# Patient Record
Sex: FEMALE | Race: WHITE | NOT HISPANIC OR LATINO | URBAN - METROPOLITAN AREA
[De-identification: names, ages, dates, MRNs, and addresses within clinical notes are randomized per-mention and may not be internally consistent; named-entity substitution may affect disease eponyms.]

---

## 2022-06-15 ENCOUNTER — INPATIENT (INPATIENT)
Facility: HOSPITAL | Age: 32
LOS: 4 days | Discharge: HOME | End: 2022-06-20
Attending: PSYCHIATRY & NEUROLOGY | Admitting: PSYCHIATRY & NEUROLOGY
Payer: COMMERCIAL

## 2022-06-15 VITALS
DIASTOLIC BLOOD PRESSURE: 86 MMHG | OXYGEN SATURATION: 98 % | TEMPERATURE: 98 F | SYSTOLIC BLOOD PRESSURE: 110 MMHG | RESPIRATION RATE: 18 BRPM | HEART RATE: 78 BPM

## 2022-06-15 DIAGNOSIS — F32.9 MAJOR DEPRESSIVE DISORDER, SINGLE EPISODE, UNSPECIFIED: ICD-10-CM

## 2022-06-15 LAB
ALBUMIN SERPL ELPH-MCNC: 4.7 G/DL — SIGNIFICANT CHANGE UP (ref 3.3–5)
ALP SERPL-CCNC: 97 U/L — SIGNIFICANT CHANGE UP (ref 40–120)
ALT FLD-CCNC: 13 U/L — SIGNIFICANT CHANGE UP (ref 4–33)
AMPHET UR-MCNC: NEGATIVE — SIGNIFICANT CHANGE UP
ANION GAP SERPL CALC-SCNC: 11 MMOL/L — SIGNIFICANT CHANGE UP (ref 7–14)
APAP SERPL-MCNC: <10 UG/ML — LOW (ref 15–25)
APPEARANCE UR: ABNORMAL
AST SERPL-CCNC: 15 U/L — SIGNIFICANT CHANGE UP (ref 4–32)
BACTERIA # UR AUTO: ABNORMAL
BARBITURATES UR SCN-MCNC: NEGATIVE — SIGNIFICANT CHANGE UP
BASOPHILS # BLD AUTO: 0.08 K/UL — SIGNIFICANT CHANGE UP (ref 0–0.2)
BASOPHILS NFR BLD AUTO: 1.2 % — SIGNIFICANT CHANGE UP (ref 0–2)
BENZODIAZ UR-MCNC: NEGATIVE — SIGNIFICANT CHANGE UP
BILIRUB SERPL-MCNC: 0.3 MG/DL — SIGNIFICANT CHANGE UP (ref 0.2–1.2)
BILIRUB UR-MCNC: NEGATIVE — SIGNIFICANT CHANGE UP
BUN SERPL-MCNC: 15 MG/DL — SIGNIFICANT CHANGE UP (ref 7–23)
CALCIUM SERPL-MCNC: 9.5 MG/DL — SIGNIFICANT CHANGE UP (ref 8.4–10.5)
CHLORIDE SERPL-SCNC: 104 MMOL/L — SIGNIFICANT CHANGE UP (ref 98–107)
CO2 SERPL-SCNC: 23 MMOL/L — SIGNIFICANT CHANGE UP (ref 22–31)
COCAINE METAB.OTHER UR-MCNC: NEGATIVE — SIGNIFICANT CHANGE UP
COLOR SPEC: YELLOW — SIGNIFICANT CHANGE UP
CREAT SERPL-MCNC: 0.82 MG/DL — SIGNIFICANT CHANGE UP (ref 0.5–1.3)
CREATININE URINE RESULT, DAU: 253 MG/DL — SIGNIFICANT CHANGE UP
DIFF PNL FLD: NEGATIVE — SIGNIFICANT CHANGE UP
EGFR: 97 ML/MIN/1.73M2 — SIGNIFICANT CHANGE UP
EOSINOPHIL # BLD AUTO: 0.24 K/UL — SIGNIFICANT CHANGE UP (ref 0–0.5)
EOSINOPHIL NFR BLD AUTO: 3.5 % — SIGNIFICANT CHANGE UP (ref 0–6)
EPI CELLS # UR: 9 /HPF — HIGH (ref 0–5)
ETHANOL SERPL-MCNC: <10 MG/DL — SIGNIFICANT CHANGE UP
GLUCOSE SERPL-MCNC: 83 MG/DL — SIGNIFICANT CHANGE UP (ref 70–99)
GLUCOSE UR QL: NEGATIVE — SIGNIFICANT CHANGE UP
HCG SERPL-ACNC: <5 MIU/ML — SIGNIFICANT CHANGE UP
HCT VFR BLD CALC: 36.6 % — SIGNIFICANT CHANGE UP (ref 34.5–45)
HGB BLD-MCNC: 11.4 G/DL — LOW (ref 11.5–15.5)
HYALINE CASTS # UR AUTO: 5 /LPF — SIGNIFICANT CHANGE UP (ref 0–7)
IANC: 2.9 K/UL — SIGNIFICANT CHANGE UP (ref 1.8–7.4)
IMM GRANULOCYTES NFR BLD AUTO: 0.3 % — SIGNIFICANT CHANGE UP (ref 0–1.5)
KETONES UR-MCNC: NEGATIVE — SIGNIFICANT CHANGE UP
LEUKOCYTE ESTERASE UR-ACNC: ABNORMAL
LYMPHOCYTES # BLD AUTO: 3.11 K/UL — SIGNIFICANT CHANGE UP (ref 1–3.3)
LYMPHOCYTES # BLD AUTO: 45.2 % — HIGH (ref 13–44)
MCHC RBC-ENTMCNC: 25.7 PG — LOW (ref 27–34)
MCHC RBC-ENTMCNC: 31.1 GM/DL — LOW (ref 32–36)
MCV RBC AUTO: 82.6 FL — SIGNIFICANT CHANGE UP (ref 80–100)
METHADONE UR-MCNC: NEGATIVE — SIGNIFICANT CHANGE UP
MONOCYTES # BLD AUTO: 0.53 K/UL — SIGNIFICANT CHANGE UP (ref 0–0.9)
MONOCYTES NFR BLD AUTO: 7.7 % — SIGNIFICANT CHANGE UP (ref 2–14)
NEUTROPHILS # BLD AUTO: 2.9 K/UL — SIGNIFICANT CHANGE UP (ref 1.8–7.4)
NEUTROPHILS NFR BLD AUTO: 42.1 % — LOW (ref 43–77)
NITRITE UR-MCNC: NEGATIVE — SIGNIFICANT CHANGE UP
NRBC # BLD: 0 /100 WBCS — SIGNIFICANT CHANGE UP
NRBC # FLD: 0 K/UL — SIGNIFICANT CHANGE UP
OPIATES UR-MCNC: NEGATIVE — SIGNIFICANT CHANGE UP
OXYCODONE UR-MCNC: NEGATIVE — SIGNIFICANT CHANGE UP
PCP SPEC-MCNC: SIGNIFICANT CHANGE UP
PCP UR-MCNC: NEGATIVE — SIGNIFICANT CHANGE UP
PH UR: 6.5 — SIGNIFICANT CHANGE UP (ref 5–8)
PLATELET # BLD AUTO: 325 K/UL — SIGNIFICANT CHANGE UP (ref 150–400)
POTASSIUM SERPL-MCNC: 4.2 MMOL/L — SIGNIFICANT CHANGE UP (ref 3.5–5.3)
POTASSIUM SERPL-SCNC: 4.2 MMOL/L — SIGNIFICANT CHANGE UP (ref 3.5–5.3)
PROT SERPL-MCNC: 7.7 G/DL — SIGNIFICANT CHANGE UP (ref 6–8.3)
PROT UR-MCNC: ABNORMAL
RBC # BLD: 4.43 M/UL — SIGNIFICANT CHANGE UP (ref 3.8–5.2)
RBC # FLD: 14.6 % — HIGH (ref 10.3–14.5)
RBC CASTS # UR COMP ASSIST: 4 /HPF — SIGNIFICANT CHANGE UP (ref 0–4)
SALICYLATES SERPL-MCNC: <0.3 MG/DL — LOW (ref 15–30)
SODIUM SERPL-SCNC: 138 MMOL/L — SIGNIFICANT CHANGE UP (ref 135–145)
SP GR SPEC: 1.03 — SIGNIFICANT CHANGE UP (ref 1–1.05)
THC UR QL: NEGATIVE — SIGNIFICANT CHANGE UP
UROBILINOGEN FLD QL: SIGNIFICANT CHANGE UP
WBC # BLD: 6.88 K/UL — SIGNIFICANT CHANGE UP (ref 3.8–10.5)
WBC # FLD AUTO: 6.88 K/UL — SIGNIFICANT CHANGE UP (ref 3.8–10.5)
WBC UR QL: 15 /HPF — HIGH (ref 0–5)

## 2022-06-15 PROCEDURE — 99285 EMERGENCY DEPT VISIT HI MDM: CPT

## 2022-06-15 RX ORDER — SERTRALINE 25 MG/1
150 TABLET, FILM COATED ORAL DAILY
Refills: 0 | Status: DISCONTINUED | OUTPATIENT
Start: 2022-06-16 | End: 2022-06-16

## 2022-06-15 NOTE — ED ADULT NURSE NOTE - OBJECTIVE STATEMENT
Pt BIB  for c/o increased depression and SI without plan.  Pt states she's not eating or sleeping and cannot function.  Pt is very tearful.  Pt denies SI/HI, AH/VH, ETOH, substance use.  PMhx of depression.

## 2022-06-15 NOTE — ED BEHAVIORAL HEALTH NOTE - BEHAVIORAL HEALTH NOTE
As per request of provider, writer contacted patient’s  [facility]les ([phone]) for collateral information. The following information is per patient’s .    Patient is a 30 YO Female domiciled with her  Robles and 6 month old baby. Patient BIB  for worsening depression and Si.  reports the patient shared that with the  today that she looked up how much pills she would need to take to end her life.  says the patient reports being too “chicken shit” to attempt to end her life. Patient reports to  that she is afraid that if something went wrong she would act on it. Patient has a hx of Si and bad spells of depression. No prior suicide attempts that the  is aware of.  reports he noticed the patient appearing depressed this past month. They tried to take her to her family this weekend in [place] but it didn’t help. Today the patient asked for help and wanted to come to the ED.  reports he has noticed the patient appear really depressed, lose interest in going to the park, has a blank stare with the baby and has been watching an unnormal amount of tv. Patient’s sleep is poor due to the baby, hygiene is not at baseline and appetite is poor.  reports patient has no prior psych hospitalizations and is connected to a private psychiatrist currently (Dr.Jordan Magdaleno).  unsure of the psychiatrist phone number. Patient has a hx of Zoloft 100 MG. Patient’s Zoloft was recently increased to 150Mg last month and the doctor also added Wellbutrin (unsure of dosage) a few weeks ago.  says the patient is compliant with medication. No drugs or alcohol reported.  says the patient has no medical problems and is covid vaccinated/boosted as of last year. Writer agreed to keep the  updated.

## 2022-06-15 NOTE — ED BEHAVIORAL HEALTH ASSESSMENT NOTE - MEDICAL ISSUES AND PLAN (INCLUDE STANDING AND PRN MEDICATION)
No known pertinent PMH- patient exclusively breastfeeding at this time, requesting to pump in hospital if possible

## 2022-06-15 NOTE — ED BEHAVIORAL HEALTH ASSESSMENT NOTE - SUMMARY
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 Psychiatry is consulted for further assessment.    On evaluation tonight, patient presents as calm and cooperative though markedly tearful, reporting depressed mood and  worsening suicidal ideation over the last several weeks. Patient noting suicidal thoughts have persisted despite attempts made by family to help distract her, upset and distressed that she has started searching online for lethal doses of medication to use in possible overdose. Able to cite some protective factors, however appears ambivalent about whether these thoughts might progress and whether she might "be capable" of acting with intent to harm herself or end her life.     Discussed that in light of depression and suicidal thoughts, patient currently deemed to pose acute risk to self. Explained process of voluntary hospitalization and patient is amenable. Reviewed paperwork with writer, signed 9.13 placed in chart. Patient would benefit from inpatient hospitalization for safety and stabilization.

## 2022-06-15 NOTE — ED BEHAVIORAL HEALTH ASSESSMENT NOTE - DETAILS
Recently googled lethal dose of wellbutrin, denies taking steps to enact this plan D/w  Blane D/w LUCY Dr. Robertson 6mos old

## 2022-06-15 NOTE — ED BEHAVIORAL HEALTH ASSESSMENT NOTE - DESCRIPTION
Patient monitored in  area, calm and cooperative. No PRN medication required or received.     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

## 2022-06-15 NOTE — ED BEHAVIORAL HEALTH ASSESSMENT NOTE - NSSUICPROTFACT_PSY_ALL_CORE
Responsibility to children, family, or others/Identifies reasons for living/Supportive social network of family or friends/Positive therapeutic relationships/Other

## 2022-06-15 NOTE — ED BEHAVIORAL HEALTH ASSESSMENT NOTE - PSYCHIATRIC ISSUES AND PLAN (INCLUDE STANDING AND PRN MEDICATION)
Continue home zoloft 150mg daily, hold wellbutrin 150mg daily, PRN- Ativan 2mg IM/PO q6hrs severe anxiety/agitation

## 2022-06-15 NOTE — ED PROVIDER NOTE - OBJECTIVE STATEMENT
31 year-old-female with past medical history of depress  presents to the emergency department with suicidal thoughts  does not have a plan  but did look up how much bupropion to take  she is prescribed bupropion by her psychiatrist, who instructed her to stop taking it  no homicidal ideation, no ah, no vh  no medical complaints  of note recently had a baby in december  story corroborated by  at [phone]

## 2022-06-15 NOTE — ED ADULT TRIAGE NOTE - CHIEF COMPLAINT QUOTE
p/t with hx postpartum depression, c/o of increased depression and SI no plan, p/t appears calm and cooperative @ present,

## 2022-06-15 NOTE — ED BEHAVIORAL HEALTH ASSESSMENT NOTE - HPI (INCLUDE ILLNESS QUALITY, SEVERITY, DURATION, TIMING, CONTEXT, MODIFYING FACTORS, ASSOCIATED SIGNS AND SYMPTOMS)
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 Psychiatry is consulted for further assessment.    On evaluation tonight, patient reports "I feel so overwhelmed," notes that she has had history of depression since she was a teenager and was managed on sertraline for many years. Since delivering her son in Dec 2021, patient states "I think I've been getting worse instead of better." Notes that she has experienced worsening depressed mood, poor energy, sleep disturbance, decreased appetite. Also reports "really bad intrusive thoughts," adding that these are suicidal in nature. States "I never thought things would get as bad as they have, but the other night I found myself googling how much wellbutrin I would need to take to kill myself." Patient states that this was upsetting and frightening to her and adds, "it makes me wonder about how terrible things could get because I was stunned at what I was researching." Patient denies having taken any steps to carry out possible OD on wellbutrin, does appear to have some ambivalence about whether she would "end up doing that just because I have been getting worse and worse." Is able to cite young son and partner as protective factors but becomes tearful stating "even though I have them I still feel so bad." Also notes poor sleep as she is primary caretaker for baby overnight. Family took her to father's house in [place] this weekend to "see if taking a break would help me," but states she felt the same despite the change in scenery. Denies HI, denies symptoms of indira including elevated mood and decreased need for sleep. Denies AH/VH. Denies intrusive thoughts about harming baby. Reports infant is being looked after by her mother in law at home. Denies use of substances including alcohol, tobacco and marijuana. Denies access to firearms.     Reports wellbutrin was added to her regimen several weeks ago for augmentation of zoloft, however notes "the NP and I decided it might be making me feel worse instead of better so we stopped it." Continues to take sertraline 150mg daily as prescribed.

## 2022-06-15 NOTE — ED BEHAVIORAL HEALTH ASSESSMENT NOTE - RISK ASSESSMENT
Moderate Acute Suicide Risk Patient is currently at low acute risk of suicide as she denies SI/HI in context of current hospitalization. Risk factors include history of mood disorder, multiple psychosocial stressors, recently gave birth, limited ability to engage in self-care (eg. poor sleep). Protective factors include strong social support, stable relationship, minor child, current sobriety, limited access to means, currently engaged in outpatient treatment.

## 2022-06-15 NOTE — ED BEHAVIORAL HEALTH NOTE - BEHAVIORAL HEALTH NOTE
COVID Exposure Screen- Patient  1.	*Have you had a COVID-19 test in the last 90 days?  (  ) Yes   ( X ) No   (  ) Unknown- Reason: _____  IF YES PROCEED TO QUESTION #2. IF NO OR UNKNOWN, PLEASE SKIP TO QUESTION #3.  2.	Date of test(s) and result(s): ________  3.	*Have you tested positive for COVID-19 antibodies? (  ) Yes   (  ) No   ( X ) Unknown- Reason: _____  IF YES PROCEED TO QUESTION #4. IF NO or UNKNOWN, PLEASE SKIP TO QUESTION #5.  4.	Date of positive antibody test: ________  5.	*Have you received 2 doses of the COVID-19 vaccine? ( X ) Yes   (  ) No   (  ) Unknown- Reason: _____   IF YES PROCEED TO QUESTION #6. IF NO or UNKNOWN, PLEASE SKIP TO QUESTION #7.  6.	Date of second dose: ________  7.	*In the past 10 days, have you been around anyone with a positive COVID-19 test?* (  ) Yes   ( X ) No   (  ) Unknown- Reason: ____  IF YES PROCEED TO QUESTION #8. IF NO or UNKNOWN, PLEASE SKIP TO QUESTION #13.  8.	Were you within 6 feet of them for at least 15 minutes? (  ) Yes   (  ) No   (  ) Unknown- Reason: _____  9.	Have you provided care for them? (  ) Yes   (  ) No   (  ) Unknown- Reason: ______  10.	Have you had direct physical contact with them (touched, hugged, or kissed them)? (  ) Yes   (  ) No    (  ) Unknown- Reason: _____  11.	Have you shared eating or drinking utensils with them? (  ) Yes   (  ) No    (  ) Unknown- Reason: ____  12.	Have they sneezed, coughed, or somehow gotten respiratory droplets on you? (  ) Yes   (  ) No    (  ) Unknown- Reason: ______  13.	*Have you been out of [place] within the past 10 days?* ( X ) Yes   (  ) No   (  ) Unknown- Reason: _____  IF YES PLEASE ANSWER THE FOLLOWING QUESTIONS:  14.	Which state/country have you been to? [place]  15.	Were you there over 24 hours? ( X ) Yes   (  ) No    (  ) Unknown- Reason: ______  16.	Date of return to [facility]: 6/12/22

## 2022-06-16 LAB
COVID-19 SPIKE DOMAIN AB INTERP: POSITIVE
COVID-19 SPIKE DOMAIN ANTIBODY RESULT: >250 U/ML — HIGH
FLUAV AG NPH QL: SIGNIFICANT CHANGE UP
FLUBV AG NPH QL: SIGNIFICANT CHANGE UP
RSV RNA NPH QL NAA+NON-PROBE: SIGNIFICANT CHANGE UP
SARS-COV-2 IGG+IGM SERPL QL IA: >250 U/ML — HIGH
SARS-COV-2 IGG+IGM SERPL QL IA: POSITIVE
SARS-COV-2 RNA SPEC QL NAA+PROBE: SIGNIFICANT CHANGE UP

## 2022-06-16 PROCEDURE — 99223 1ST HOSP IP/OBS HIGH 75: CPT

## 2022-06-16 RX ORDER — SERTRALINE 25 MG/1
175 TABLET, FILM COATED ORAL DAILY
Refills: 0 | Status: DISCONTINUED | OUTPATIENT
Start: 2022-06-16 | End: 2022-06-17

## 2022-06-16 RX ADMIN — SERTRALINE 150 MILLIGRAM(S): 25 TABLET, FILM COATED ORAL at 13:22

## 2022-06-16 NOTE — BH INPATIENT PSYCHIATRY ASSESSMENT NOTE - NSBHCHARTREVIEWVS_PSY_A_CORE FT
Vital Signs Last 24 Hrs  T(C): 36.7 (06-16-22 @ 01:21), Max: 37 (06-15-22 @ 20:51)  T(F): 98 (06-16-22 @ 01:21), Max: 98.6 (06-15-22 @ 20:51)  HR: 63 (06-15-22 @ 23:54) (63 - 79)  BP: 113/77 (06-15-22 @ 23:54) (110/86 - 113/79)  BP(mean): --  RR: 18 (06-15-22 @ 23:54) (18 - 18)  SpO2: 100% (06-15-22 @ 23:54) (98% - 100%)    Orthostatic VS  06-16-22 @ 01:21  Lying BP: --/-- HR: --  Sitting BP: 135/87 HR: 75  Standing BP: 126/86 HR: 89  Site: upper left arm  Mode: electronic   Vital Signs Last 24 Hrs  T(C): 36.7 (06-16-22 @ 01:21), Max: 36.7 (06-16-22 @ 01:21)  T(F): 98 (06-16-22 @ 01:21), Max: 98 (06-16-22 @ 01:21)  HR: 63 (06-15-22 @ 23:54) (63 - 63)  BP: 113/77 (06-15-22 @ 23:54) (113/77 - 113/77)  BP(mean): --  RR: 18 (06-15-22 @ 23:54) (18 - 18)  SpO2: 100% (06-15-22 @ 23:54) (100% - 100%)    Orthostatic VS  06-16-22 @ 01:21  Lying BP: --/-- HR: --  Sitting BP: 135/87 HR: 75  Standing BP: 126/86 HR: 89  Site: upper left arm  Mode: electronic

## 2022-06-16 NOTE — BH INPATIENT PSYCHIATRY ASSESSMENT NOTE - NSBHCHARTREVIEWLAB_PSY_A_CORE FT
CBC               11.4   6.88  )-----------( 325      ( 15 Lauri 2022 22:00 )             36.6     CMP  06-15    138  |  104  |  15  ----------------------------<  83  4.2   |  23  |  0.82    Ca    9.5      15 Lauri 2022 22:00    TPro  7.7  /  Alb  4.7  /  TBili  0.3  /  DBili  x   /  AST  15  /  ALT  13  /  AlkPhos  97  06-15

## 2022-06-16 NOTE — PSYCHIATRIC REHAB INITIAL EVALUATION - NSBHPRRECOMMEND_PSY_ALL_CORE
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 Patient endorsed poor sleep, low energy, decreased appetite. Patient denied AH/VH. Patient identified her son as her protective factor.   Writer attempted to meet with patient in order to orient the patient to the unit, and provide her with a copy of the unit schedule, however the patient was engaged in meeting with her son. Patient and writer were unable to establish a collaborative rehabilitation goal, therefore an appropriate goal will be chosen by writer. Psychiatric rehabilitation staff will continue to provide ongoing support and encouragement.

## 2022-06-16 NOTE — BH PATIENT PROFILE - FALL HARM RISK - UNIVERSAL INTERVENTIONS
Bed in lowest position, wheels locked, appropriate side rails in place/Call bell, personal items and telephone in reach/Instruct patient to call for assistance before getting out of bed or chair/Non-slip footwear when patient is out of bed/Gordon to call system/Physically safe environment - no spills, clutter or unnecessary equipment/Purposeful Proactive Rounding/Room/bathroom lighting operational, light cord in reach

## 2022-06-16 NOTE — BH INPATIENT PSYCHIATRY ASSESSMENT NOTE - CASE SUMMARY
Pt is a 30yo woman living with  and 6 month old baby, w/ PPhx depression, no prior hospitalizations or suicide attempts, in treatment with private psych NP for the past 4+ years, presenting with worsening depression including low mood, low energy, poor sleep, and passive SI (though recently researched overdose on Wellbutrin). Also feeling detached from her role as a mother and feeling significant guilt. Denies having thoughts of harming her child. States such symptoms worsening over the past 3 months, recently titrated Zoloft to 150mg and briefly trialed Wellbutrin (which made her anxiety worse). Pt remains hopeful about feeling better. Denies SIIP at this time and adamantely denied previous suicidal intent. Dx most consistent with postpartum depression. Case discussed with outpt psych NP as well as patient's . Will titrate Zoloft to 175mg daily. Will encourage pt to engage in individual and group therapy.

## 2022-06-16 NOTE — BH INPATIENT PSYCHIATRY ASSESSMENT NOTE - VIOLENCE PROTECTIVE FACTORS:
Residential stability/Relationship stability/Employment stability/Sobriety/Affective Stability/Good treatment response/compliance

## 2022-06-16 NOTE — BH INPATIENT PSYCHIATRY ASSESSMENT NOTE - HPI (INCLUDE ILLNESS QUALITY, SEVERITY, DURATION, TIMING, CONTEXT, MODIFYING FACTORS, ASSOCIATED SIGNS AND SYMPTOMS)
The patient is a 31 year-old female, , domiciled at home with  and infant (born December 2021), PPHx of MDD since she was a teenager (has been stable on Zoloft), no hx of inpatient Psychiatric hospitalization, no hx of SA, follows outpatient with Psychiatrist, no substance use history, admitted to [facility] 3 after presenting to [facility] ED BIB self after worsening symptoms of MDD, with SI.     Upon initial evaluation, the patient was alert, cooperative, anxious and tearful at times. She was seen in her room by the Primary Team. She states that she has been stable on Zoloft 75mg for years, but had to increase her medication dosage to 100mg in the past year during her pregnancy due to increased symptoms of depression and anxiety. The patient states that her MDD symptoms include: anhedonia (with isolation), increased sleep, depressed mood, decreased energy, and intermittent passive suicidal ideations of "what if I was not here anymore?" She states that after her pregnancy, her medication was increased to 150mg as she started having more intense symptoms of MDD as above. Although she has had weeks during which time she would enjoy time with her infant and have less symptoms of MDD, as of recently, she has had more recurrence of those symptoms. Her Psychiatrist also briefly trialed Wellbutrin, but discontinued it quickly as it was causing symptoms of anxiety. Regarding her most recent SI, she states that she was looking for the side effects of Wellbutrin and "Vocollect suggested links to 'how much Wellbutrin would it take to kill someone?'" The patient denied that she had active intent at the time and did not make any solidified plans. She denies SIIP currently and endorses feeling safe on the unit. She also feels comfortable to approach RN staff for any acute concerns. When discussing symptoms of indira and psychosis, she denies ever experiencing symptoms of indira or psychosis. She also denies a significant substance use history. She states that she is amenable to staying on the unit in the short term so that she can work on any medication titrations, changes, and/or outpatient program referrals as per team recommendations.

## 2022-06-16 NOTE — BH INPATIENT PSYCHIATRY ASSESSMENT NOTE - MSE UNSTRUCTURED FT
Appearance: Patient appears stated age and biological sex. Appropriately dressed and groomed in hospital gown.  Behavior: Patient was cooperative, in good behavioral control.  Speech: Normal in rate, tone, production, volume.  Motor: No gross motor abnormalities noted including clinically significant PMR/PMA, tremors, tics. Gait/station WNL.  Mood: Patient endorsed mood to be "depressed and anxious."  Affect: Depressed, anxious, tearful at times, stable, normal expressivity, congruent to mood.  Thought Process: Linear, logical  Thought Content: Appropriate given context, no preoccupations or obsessions. Currently denies SI/HI.  Perceptions: Denies A/V hallucinations.  Attention: Fair in the context of the Psychiatric interview.  Cognition: Suspect baseline to be grossly intact.   Memory: Suspect baseline to be grossly intact. Long-term memory overall intact in the context of current events.   Insight: Fair  Judgement: Fair  Impulsivity: Fair in the context of the inpatient unit.

## 2022-06-16 NOTE — BH INPATIENT PSYCHIATRY ASSESSMENT NOTE - DETAILS
As per HPI: patient recently googled lethal dose of Wellbutrin, denies taking steps to enact this plan.

## 2022-06-16 NOTE — BH PATIENT PROFILE - NSBHAPPR_PSY_A_CORE
Spoke to mother. She stated that patient has the flu with cough and fever, would like to r/s appt. Patient has been r/s to 03/02 @ 330pm.//[facility]   neat/clean

## 2022-06-16 NOTE — BH INPATIENT PSYCHIATRY ASSESSMENT NOTE - RISK ASSESSMENT
Modifiable Risk Factors: current symptoms of MDD, current anxiety, recent passive SI, current peripartum timeline  Unmodifiable Risk Factors: PPHx of MDD, hx of passive SI  Protective Factors: stable housing, supportive family, identifies reasons for living, sobriety, treatment adherence, therapeutic alliance  Given the factors above, the patient is at a moderate risk of harm to herself and a low risk of harm to others, and meets criteria for a voluntary admission for inpatient Psychiatric hospitalization for safety, stabilization, and treatment.

## 2022-06-16 NOTE — BH INPATIENT PSYCHIATRY ASSESSMENT NOTE - OTHER PAST PSYCHIATRIC HISTORY (INCLUDE DETAILS REGARDING ONSET, COURSE OF ILLNESS, INPATIENT/OUTPATIENT TREATMENT)
As per HPI, the patient has a long history of outpatient treatment and symptoms managed with sertraline. No previous inpatient Psychiatric hospitalizations.

## 2022-06-16 NOTE — BH SOCIAL WORK INITIAL PSYCHOSOCIAL EVALUATION - OTHER PAST PSYCHIATRIC HISTORY (INCLUDE DETAILS REGARDING ONSET, COURSE OF ILLNESS, INPATIENT/OUTPATIENT TREATMENT)
Patient is a 31F,, , delivered son in Dec 2021 (exclusively breastfeeding), owns a bagley shop, domiciled in private residence, PPHx depression managed by outpatient NP Geovany Magdaleno on sertraline and  newly added bupropion, no prior inpatient admissions, no history of SAs, no known legal/violence/substance history, no known PMH, brought in by  tonight at recommendation of NP for worsening depression and suicidal thoughts. Psychiatry is consulted for further assessment.

## 2022-06-16 NOTE — BH INPATIENT PSYCHIATRY ASSESSMENT NOTE - CURRENT MEDICATION
MEDICATIONS  (STANDING):  sertraline 150 milliGRAM(s) Oral daily    MEDICATIONS  (PRN):  LORazepam     Tablet 2 milliGRAM(s) Oral every 6 hours PRN severe anxiety/agitation  LORazepam   Injectable 2 milliGRAM(s) IntraMuscular once PRN severe anxiety/agitation   MEDICATIONS  (STANDING):  sertraline 175 milliGRAM(s) Oral daily    MEDICATIONS  (PRN):  LORazepam     Tablet 2 milliGRAM(s) Oral every 6 hours PRN severe anxiety/agitation  LORazepam   Injectable 2 milliGRAM(s) IntraMuscular once PRN severe anxiety/agitation

## 2022-06-16 NOTE — BH PSYCHOLOGY - CLINICIAN PSYCHOTHERAPY NOTE - NSBHPSYCHOLNARRATIVE_PSY_A_CORE FT
Supervising Psychologist met with the Patient for an individual supportive therapy session. Patient appeared both happy and sad; her visit with her baby had concluded earlier. She described her experience at the hospital as 'surreal' and spoke about wanting to be here yet wanting to be home as well. Patient and writer explored the pros and cons of staying in the hospital over the weekend. Writer outlined the importance of establishing a routing while on the unit. Patient spoke of reading a book she was familiar with as well as pumping and sleeping. Although tearful at times, patient recognized the importance of taking care of herself now so that she can take care of the baby later. Patient agreed to continue meeting for individual supportive therapy sessions.

## 2022-06-16 NOTE — BH INPATIENT PSYCHIATRY ASSESSMENT NOTE - NSBHASSESSSUMMFT_PSY_ALL_CORE
The patient is a 31 year-old female, , domiciled at home with  and infant (born 2023), PPHx of MDD since she was a teenager (has been stable on Zoloft), no hx of inpatient Psychiatric hospitalization, no hx of SA, follows outpatient with Psychiatrist, no substance use history, admitted to [facility] 3 after presenting to [facility] ED BIB self after worsening symptoms of MDD, with SI.     Upon initial evaluation and per chart documentation, the patient is likely experiencing recurrent symptoms of MDD 2/2 peripartum stressors. She likely meets DSM-V criteria for MDD with peripartum onset and is currently refractory to her current outpatient medication regimen. She is not currently actively suicidal and her recent SI was more ruminative rather than with active intent and plan. Overall, the patient is at a moderate risk of harm to herself and a low risk of harm to others, and meets criteria for a voluntary admission for inpatient Psychiatric hospitalization for safety, stabilization, and treatment. Will work to adjust current antidepressant regimen and also explore options for outpatient  Psychiatry programs.     Plan:  1. Routine Observation  2. Will continue with Zoloft 150mg qD as per outpatient regimen with plans of titrating to 200mg qD.   3. Encourage participation in group, individual, milieu therapy.  4. Dispo: Exploring options for outpatient  Psychiatry programs as per patient preference.  The patient is a 31 year-old female, , domiciled at home with  and infant (born 2023), PPHx of MDD since she was a teenager (has been stable on Zoloft), no hx of inpatient Psychiatric hospitalization, no hx of SA, follows outpatient with Psychiatrist, no substance use history, admitted to [facility] 3 after presenting to [facility] ED BIB self after worsening symptoms of MDD, with SI.     Upon initial evaluation and per chart documentation, the patient is likely experiencing recurrent symptoms of MDD 2/2 peripartum stressors. She likely meets DSM-V criteria for MDD with peripartum onset and is currently refractory to her current outpatient medication regimen. She is not currently actively suicidal and her recent SI was more ruminative rather than with active intent and plan. Overall, the patient is at a moderate risk of harm to herself and a low risk of harm to others, and meets criteria for a voluntary admission for inpatient Psychiatric hospitalization for safety, stabilization, and treatment. Will work to adjust current antidepressant regimen and also explore options for outpatient  Psychiatry programs.     Plan:  1. Routine Observation  2. Will titrate Zoloft to 175mg qD   3. Encourage participation in group, individual, milieu therapy.  4. Dispo: Exploring options for outpatient  Psychiatry programs as per patient preference.

## 2022-06-17 PROCEDURE — 99232 SBSQ HOSP IP/OBS MODERATE 35: CPT

## 2022-06-17 RX ORDER — SERTRALINE 25 MG/1
175 TABLET, FILM COATED ORAL DAILY
Refills: 0 | Status: COMPLETED | OUTPATIENT
Start: 2022-06-18 | End: 2022-06-18

## 2022-06-17 RX ORDER — SERTRALINE 25 MG/1
2 TABLET, FILM COATED ORAL
Qty: 60 | Refills: 0
Start: 2022-06-17 | End: 2022-07-16

## 2022-06-17 RX ORDER — SERTRALINE 25 MG/1
150 TABLET, FILM COATED ORAL
Qty: 0 | Refills: 0 | DISCHARGE

## 2022-06-17 RX ORDER — SERTRALINE 25 MG/1
200 TABLET, FILM COATED ORAL DAILY
Refills: 0 | Status: DISCONTINUED | OUTPATIENT
Start: 2022-06-19 | End: 2022-06-20

## 2022-06-17 RX ADMIN — SERTRALINE 175 MILLIGRAM(S): 25 TABLET, FILM COATED ORAL at 09:24

## 2022-06-17 NOTE — BH INPATIENT PSYCHIATRY PROGRESS NOTE - CURRENT MEDICATION
MEDICATIONS  (STANDING):  sertraline 175 milliGRAM(s) Oral daily    MEDICATIONS  (PRN):  LORazepam     Tablet 2 milliGRAM(s) Oral every 6 hours PRN severe anxiety/agitation  LORazepam   Injectable 2 milliGRAM(s) IntraMuscular once PRN severe anxiety/agitation   MEDICATIONS  (STANDING):    MEDICATIONS  (PRN):  LORazepam     Tablet 2 milliGRAM(s) Oral every 6 hours PRN severe anxiety/agitation  LORazepam   Injectable 2 milliGRAM(s) IntraMuscular once PRN severe anxiety/agitation

## 2022-06-17 NOTE — BH INPATIENT PSYCHIATRY PROGRESS NOTE - NSBHASSESSSUMMFT_PSY_ALL_CORE
The patient is a 31 year-old female, , domiciled at home with  and infant (born 2023), PPHx of MDD since she was a teenager (has been stable on Zoloft), no hx of inpatient Psychiatric hospitalization, no hx of SA, follows outpatient with Psychiatrist, no substance use history, admitted to [facility] 3 after presenting to [facility] ED BIB self after worsening symptoms of MDD, with SI.     The patient was euthymic, less anxious today with improvement in sleep as well. She is not suicidal and has been social with her roommate. She is amenable to increases to Zoloft and plans to establish care with an outpatient psychotherapist upon discharge.    Plan:  1. Routine Observation  2. C/w Zoloft 175mg qD, plan to titrate to 200mg qD on 22.    3. Encourage participation in group, individual, milieu therapy.  4. Dispo: Exploring options for outpatient  Psychiatry programs as per patient preference.

## 2022-06-17 NOTE — BH DISCHARGE NOTE NURSING/SOCIAL WORK/PSYCH REHAB - NSDCADDINFO1FT_PSY_ALL_CORE
SW made referrals to [email or web address] and [facility] as well.  SW will follow up with Pt regarding these referrals.

## 2022-06-17 NOTE — BH DISCHARGE NOTE NURSING/SOCIAL WORK/PSYCH REHAB - NSDCPRRECOMMEND_PSY_ALL_CORE
Psychiatric Rehabilitation staff recommends that the patient engage in outpatient services for continued medication and symptom management. Upon discharge, patient has an appointment scheduled with Geovany Magdaleno NP.

## 2022-06-17 NOTE — BH DISCHARGE NOTE NURSING/SOCIAL WORK/PSYCH REHAB - NSBHDCAGENCY2FT_PSY_A_CORE
Request for follow up appt was made to [facility]  Request for follow up appt was made to [facility]   Maria Antonia Benoit from intake will contact SW, Pt, and/or Pt's  after return to office on 6/21/22

## 2022-06-17 NOTE — BH DISCHARGE NOTE NURSING/SOCIAL WORK/PSYCH REHAB - DISCHARGE INSTRUCTIONS AFTERCARE APPOINTMENTS
In order to check the location, date, or time of your aftercare appointment, please refer to your Discharge Instructions Document given to you upon leaving the hospital.  If you have lost the instructions please call [phone]

## 2022-06-17 NOTE — BH DISCHARGE NOTE NURSING/SOCIAL WORK/PSYCH REHAB - NSDCPRGOAL_PSY_ALL_CORE
Writer met with patient to safety plan for discharge and discuss the patient’s progress throughout the inpatient stay. Patient was receptive to safety planning and readily identified coping skills, triggers, social support, and reasons for living. Upon admission, patient presented with depression and feelings of being overwhelmed due to psychosocial stressors in her life. Pt recently gave birth about 6 months ago and reported navigating new motherhood. Upon arrival to the unit, the pt was observed as social and engaged with low mood. Pt's stay on the unit was brief, however the pt started to exhibit improvements pretty quickly and engaged in the therapeutic milieu meaningfully. During groups, the pt offered good insight and autonomous thought and was also supportive and encouraging of her peers personal offerings. The pt was well spoken and thought about what she was going to contribute with meaning and intention. At discharge, the patient presents as eager to return to her 6 month old son and is looking forward to being back with family and starting a new balanced routine. Pt exhibited bright facial affect upon discharge and reported feeling "good" but also "nervous" about leaving the hospital. The patient was able to meet psych rehab goal during stay. The patient exhibits medication compliance, great ADLs, and good behavioral control. The patient denies SI/HI/AH/VH upon discharge from unit today. Pt encouraged to attend outpatient treatment services for medication management, support, and psychotherapy services.

## 2022-06-17 NOTE — BH DISCHARGE NOTE NURSING/SOCIAL WORK/PSYCH REHAB - PATIENT PORTAL LINK FT
You can access the FollowMyHealth Patient Portal offered by [facility] by registering at the following website: [email or web address]. By joining [facility]’s FollowMyHealth portal, you will also be able to view your health information using other applications (apps) compatible with our system.

## 2022-06-17 NOTE — BH DISCHARGE NOTE NURSING/SOCIAL WORK/PSYCH REHAB - NSCDUDCCRISIS_PSY_A_CORE
[facility] Well  1 (829) [facility]-WELL ([phone])  Text "WELL" to 53443  Website: www.[employer]/.[facility] 1 (368) 011-WIBZ (5532) Website: www.safehorizon.org/.National Suicide Prevention Lifeline 7 (970) 473-6373/.  [facility]  1 (345) LIFENET (547-0565)/.  [facility]’s Behavioral Health Crisis Center  [address] [place] [address] [phone]   Hours:  Monday through Friday from 9 AM to 3 PM/.  [facility] - [facility] Crisis Line  1 [phone] 708-6357, Option 1 Critical access hospital Well  1 (227) Critical access hospital-WELL ([phone])  Text "WELL" to 95676  Website: www.[employer]/.[facility]izons 1 (834) 751-TMDS (7818) Website: www.safehorizon.org/.National Suicide Prevention Lifeline 0 (205) 597-8819/.  [facility]  1 (300) LIFENET (195-8607)/.  [facility]’s Behavioral Health Crisis Center  [address] [place] [address] [phone]   Hours:  Monday through Friday from 9 AM to 3 PM Critical access hospital Well  1 (538) Critical access hospital-WELL ([phone])  Text "WELL" to 24722  Website: www.[employer]/.[facility]izons 1 (124) 401-UNTZ (0787) Website: www.safehorizon.org/.National Suicide Prevention Lifeline 6 (436) 313-3483/.  [facility]  1 (241) LIFENET (329-1438)/.  [facility]’s Behavioral Health Crisis Center  [address] [place] [address] [phone]   Hours:  Monday through Friday from 9 AM to 3 PM/.  [facility] Dept of  Affairs - Veterans Crisis Line  6 [phone], Option 1

## 2022-06-17 NOTE — BH INPATIENT PSYCHIATRY PROGRESS NOTE - NSBHFUPINTERVALHXFT_PSY_A_CORE
Chart reviewed, case discussed with the team. No acute behavioral events overnight. The patient is noted to be social with her roommate and adherent with all of her medications.     The patient was seen in her room. She was alert and cooperative. The patient noted some improvement in anxiety as compared to yesterday, but mentions that she is still experiencing a depressed mood overall. She denies SI/HI and A/V hallucinations. The patient was able to visit with  and infant son with staff supervision, which the patient endorsed appreciation for. She also endorsed good sleep and appetite. She is amenable to increasing her Zoloft and potential discharge back to her outpatient provider early next week.

## 2022-06-17 NOTE — BH INPATIENT PSYCHIATRY PROGRESS NOTE - CASE SUMMARY
Depression and anxiety improved slightly, pt brighter, still with passive SI though no suicidal intent or plan. Enjoying visits with her son. Reports feeling "calmer."  and family remain quite supportive. In good behavioral control and tending to ADLs appropriately. Will c/w Zoloft 175mg daily and titrate to 200mg over the weekend. Dispo planning underway.

## 2022-06-17 NOTE — BH INPATIENT PSYCHIATRY PROGRESS NOTE - NSBHCHARTREVIEWVS_PSY_A_CORE FT
Vital Signs Last 24 Hrs  T(C): 36.9 (06-17-22 @ 06:30), Max: 36.9 (06-17-22 @ 06:30)  T(F): 98.4 (06-17-22 @ 06:30), Max: 98.4 (06-17-22 @ 06:30)  HR: --  BP: --  BP(mean): --  RR: --  SpO2: --    Orthostatic VS  06-17-22 @ 06:30  Lying BP: --/-- HR: --  Sitting BP: 108/70 HR: 97  Standing BP: 103/75 HR: 97  Site: upper left arm  Mode: electronic  Orthostatic VS  06-16-22 @ 01:21  Lying BP: --/-- HR: --  Sitting BP: 135/87 HR: 75  Standing BP: 126/86 HR: 89  Site: upper left arm  Mode: electronic   Vital Signs Last 24 Hrs  T(C): 35.9 (06-18-22 @ 06:25), Max: 35.9 (06-18-22 @ 06:25)  T(F): 96.7 (06-18-22 @ 06:25), Max: 96.7 (06-18-22 @ 06:25)  HR: --  BP: --  BP(mean): --  RR: --  SpO2: --    Orthostatic VS  06-18-22 @ 06:25  Lying BP: --/-- HR: --  Sitting BP: 112/60 HR: 96  Standing BP: --/-- HR: --  Site: --  Mode: --  Orthostatic VS  06-17-22 @ 06:30  Lying BP: --/-- HR: --  Sitting BP: 108/70 HR: 97  Standing BP: 103/75 HR: 97  Site: upper left arm  Mode: electronic

## 2022-06-18 PROCEDURE — 99232 SBSQ HOSP IP/OBS MODERATE 35: CPT

## 2022-06-18 RX ADMIN — SERTRALINE 175 MILLIGRAM(S): 25 TABLET, FILM COATED ORAL at 08:43

## 2022-06-18 NOTE — BH INPATIENT PSYCHIATRY PROGRESS NOTE - NSBHFUPINTERVALHXFT_PSY_A_CORE
Patient is followed up for depression and anxiety.  Chart, medications and labs reviewed.  Patient is discussed with nursing staff. No significant overnight issues.  Per nursing report patient remains compliant with all standing medications and tolerating well. No akithisia, EPS, or tremors. Eating and sleeping well. Has maintained behavioral control, no po prns for aggression.  Patient offers no complaints.  Patient off the unit, visiting with her baby and .  Per nursing patient is breastfeeding, has been pumping and storing milk in fridge on 2W. Per nursing she denies AVH, paranoia, depression or  anxiety. Patient denies SI/SIB/HI, no plan or intent. Reports manageable anxiety, reports still depressed but improving. Brighter affect today due to visit with family.  Self- care is adequate.  Continue to monitor and provide therapeutic support. No acute medical concerns, VSS.

## 2022-06-18 NOTE — BH INPATIENT PSYCHIATRY PROGRESS NOTE - NSBHCHARTREVIEWVS_PSY_A_CORE FT
Vital Signs Last 24 Hrs  T(C): 35.9 (06-18-22 @ 06:25), Max: 35.9 (06-18-22 @ 06:25)  T(F): 96.7 (06-18-22 @ 06:25), Max: 96.7 (06-18-22 @ 06:25)  HR: --  BP: --  BP(mean): --  RR: --  SpO2: --    Orthostatic VS  06-18-22 @ 06:25  Lying BP: --/-- HR: --  Sitting BP: 112/60 HR: 96  Standing BP: --/-- HR: --  Site: --  Mode: --  Orthostatic VS  06-17-22 @ 06:30  Lying BP: --/-- HR: --  Sitting BP: 108/70 HR: 97  Standing BP: 103/75 HR: 97  Site: upper left arm  Mode: electronic

## 2022-06-18 NOTE — BH INPATIENT PSYCHIATRY PROGRESS NOTE - NSBHASSESSSUMMFT_PSY_ALL_CORE
The patient is a 31 year-old female, , domiciled at home with  and infant (born 2023), PPHx of MDD since she was a teenager (has been stable on Zoloft), no hx of inpatient Psychiatric hospitalization, no hx of SA, follows outpatient with Psychiatrist, no substance use history, admitted to [facility] 3 after presenting to [facility] ED BIB self after worsening symptoms of MDD, with SI.     The patient was euthymic, less anxious today with improvement in sleep as well. She is not suicidal and has been social with her roommate. She is amenable to increases to Zoloft and plans to establish care with an outpatient psychotherapist upon discharge.    Plan:  1. Routine Observation  2. C/w Zoloft 175mg qD, plan to titrate to 200mg qD on 22.    3. Encourage participation in group, individual, milieu therapy.  4. Dispo: Exploring options for outpatient  Psychiatry programs as per patient preference.

## 2022-06-19 PROCEDURE — 99232 SBSQ HOSP IP/OBS MODERATE 35: CPT

## 2022-06-19 RX ADMIN — SERTRALINE 200 MILLIGRAM(S): 25 TABLET, FILM COATED ORAL at 08:41

## 2022-06-19 NOTE — BH INPATIENT PSYCHIATRY PROGRESS NOTE - NSBHASSESSSUMMFT_PSY_ALL_CORE
The patient is a 31 year-old female, , domiciled at home with  and infant (born 2023), PPHx of MDD since she was a teenager (has been stable on Zoloft), no hx of inpatient Psychiatric hospitalization, no hx of SA, follows outpatient with Psychiatrist, no substance use history, admitted to [facility] 3 after presenting to [facility] ED BIB self after worsening symptoms of MDD, with SI.     The patient was euthymic, less anxious today with improvement in sleep as well. She is not suicidal and has been social with her roommate. She is amenable to increases to Zoloft and plans to establish care with an outpatient psychotherapist upon discharge.    Plan:  1. Routine Observation  2. C/w Zoloft 175mg qD, plan to titrate to 200mg qD on 22.    3. Encourage participation in group, individual, milieu therapy.  4. Dispo: Exploring options for outpatient  Psychiatry programs as per patient preference.

## 2022-06-19 NOTE — BH INPATIENT PSYCHIATRY PROGRESS NOTE - NSBHFUPINTERVALHXFT_PSY_A_CORE
Patient is followed up for depression and anxiety.  Chart, medications and labs reviewed.  Patient is discussed with nursing staff. No significant overnight issues.  Per nursing report patient remains compliant with all standing medications and tolerating well. No akithisia, EPS, or tremors. Sertraline increased 200mg daily. Eating and sleeping well. Has maintained behavioral control, no po prns for aggression.  Patient tearful this morning after peer told her it was a holiday tomorrow and may not be dc. Nursing able to provide support, reassuring her she will be dc in the morning.  Patient off the unit, visiting with her baby and .  She denies AVH, paranoia, depression or  anxiety. Patient denies SI/SIB/HI, no plan or intent. Reports manageable anxiety.   Self- care is adequate.  Continue to monitor and provide therapeutic support. No acute medical concerns, VSS.

## 2022-06-19 NOTE — BH INPATIENT PSYCHIATRY PROGRESS NOTE - NSBHCHARTREVIEWVS_PSY_A_CORE FT
Vital Signs Last 24 Hrs  T(C): --  T(F): --  HR: --  BP: --  BP(mean): --  RR: --  SpO2: --    Orthostatic VS  06-18-22 @ 06:25  Lying BP: --/-- HR: --  Sitting BP: 112/60 HR: 96  Standing BP: --/-- HR: --  Site: --  Mode: --

## 2022-06-20 VITALS — TEMPERATURE: 98 F

## 2022-06-20 PROCEDURE — 99238 HOSP IP/OBS DSCHRG MGMT 30/<: CPT

## 2022-06-20 RX ADMIN — SERTRALINE 200 MILLIGRAM(S): 25 TABLET, FILM COATED ORAL at 08:26

## 2022-06-20 NOTE — BH INPATIENT PSYCHIATRY PROGRESS NOTE - NSTXDCOTHRGOAL_PSY_ALL_CORE
Pt will accept support, medication amangement and outpatient referral .

## 2022-06-20 NOTE — BH INPATIENT PSYCHIATRY DISCHARGE NOTE - HPI (INCLUDE ILLNESS QUALITY, SEVERITY, DURATION, TIMING, CONTEXT, MODIFYING FACTORS, ASSOCIATED SIGNS AND SYMPTOMS)
The patient is a 31 year-old female, , domiciled at home with  and infant (born December 2023), PPHx of MDD since she was a teenager (has been stable on Zoloft), no hx of inpatient Psychiatric hospitalization, no hx of SA, follows outpatient with Psychiatrist, no substance use history, admitted to [facility] 3 after presenting to [facility] ED BIB self after worsening symptoms of MDD, with SI.     Upon initial evaluation, the patient was alert, cooperative, anxious and tearful at times. She was seen in her room by the Primary Team. She states that she has been stable on Zoloft 75mg for years, but had to increase her medication dosage to 100mg in the past year during her pregnancy due to increased symptoms of depression and anxiety. The patient states that her MDD symptoms include: anhedonia (with isolation), increased sleep, depressed mood, decreased energy, and intermittent passive suicidal ideations of "what if I was not here anymore?" She states that after her pregnancy, her medication was increased to 150mg as she started having more intense symptoms of MDD as above. Although she has had weeks during which time she would enjoy time with her infant and have less symptoms of MDD, as of recently, she has had more recurrence of those symptoms. Her Psychiatrist also briefly trialed Wellbutrin, but discontinued it quickly as it was causing symptoms of anxiety. Regarding her most recent SI, she states that she was looking for the side effects of Wellbutrin and "[employer] suggested links to 'how much Wellbutrin would it take to kill someone?'" The patient denied that she had active intent at the time and did not make any solidified plans. She denies SIIP currently and endorses feeling safe on the unit. She also feels comfortable to approach RN staff for any acute concerns. When discussing symptoms of indira and psychosis, she denies ever experiencing symptoms of indira or psychosis. She also denies a significant substance use history. She states that she is amenable to staying on the unit in the short term so that she can work on any medication titrations, changes, and/or outpatient program referrals as per team recommendations.

## 2022-06-20 NOTE — BH INPATIENT PSYCHIATRY PROGRESS NOTE - NSBHCONSBHPROVDETAILS_PSY_A_CORE  FT
Amenable to current treatment plan

## 2022-06-20 NOTE — BH INPATIENT PSYCHIATRY PROGRESS NOTE - NSBHCHARTREVIEWINVESTIGATE_PSY_A_CORE FT
Ventricular Rate 63 BPM    Atrial Rate 63 BPM    P-R Interval 140 ms    QRS Duration 78 ms    Q-T Interval 406 ms    QTC Calculation(Bazett) 415 ms    P Axis 62 degrees    R Axis 51 degrees    T Axis 38 degrees    Diagnosis Line Normal sinus rhythm  Normal ECG

## 2022-06-20 NOTE — BH INPATIENT PSYCHIATRY PROGRESS NOTE - MODIFICATIONS
as below
Patient seen by me, chart reviewed, and case discussed with treatment team.  Reviewed and agree with above progress note, assessment and plan; corrections and modification made where appropriate.

## 2022-06-20 NOTE — BH INPATIENT PSYCHIATRY PROGRESS NOTE - NSBHMETABOLIC_PSY_ALL_CORE_FT
BMI:   HbA1c:   Glucose:   BP: --  Lipid Panel: 
BMI:   HbA1c:   Glucose:   BP: 113/77 (06-15-22 @ 23:54) (110/86 - 113/79)  Lipid Panel: 
BMI:   HbA1c:   Glucose:   BP: 113/77 (06-15-22 @ 23:54) (110/86 - 113/79)  Lipid Panel: 
BMI:   HbA1c:   Glucose:   BP: --  Lipid Panel:

## 2022-06-20 NOTE — BH INPATIENT PSYCHIATRY PROGRESS NOTE - NSBHINPTBILLING_PSY_ALL_CORE
43545 - Inpatient Moderate Complexity
74029 - Inpatient Moderate Complexity
52661 - Inpatient Moderate Complexity
89743 - Hospital Discharge Day Management; 30 min or less

## 2022-06-20 NOTE — BH INPATIENT PSYCHIATRY PROGRESS NOTE - MSE UNSTRUCTURED FT
Appearance: Patient appears stated age and biological sex. Appropriately dressed and groomed in hospital gown.  Behavior: Patient was cooperative, in good behavioral control.  Speech: Normal in rate, tone, production, volume.  Motor: No gross motor abnormalities noted including clinically significant PMR/PMA, tremors, tics. Gait/station WNL.  Mood: Patient endorsed mood to be "less anxious today."  Affect: Euthymic, stable, normal expressivity, congruent to mood.  Thought Process: Linear, logical  Thought Content: Appropriate given context, no preoccupations or obsessions. Currently denies SI/HI.  Perceptions: Denies A/V hallucinations.  Attention: Fair in the context of the Psychiatric interview.  Cognition: Suspect baseline to be grossly intact.   Memory: Suspect baseline to be grossly intact. Long-term memory overall intact in the context of current events.   Insight: Fair  Judgement: Fair  Impulsivity: Fair in the context of the inpatient unit.
Appearance: Patient appears stated age and biological sex. Appropriately dressed and groomed in hospital gown.  Behavior: Patient was cooperative, in good behavioral control.  Speech: Normal in rate, tone, production, volume.  Motor: No gross motor abnormalities noted including clinically significant PMR/PMA, tremors, tics. Gait/station WNL.  Mood: Patient endorsed mood to be "less anxious today."  Affect: Euthymic, stable, normal expressivity, congruent to mood.  Thought Process: Linear, logical  Thought Content: Appropriate given context, no preoccupations or obsessions. Currently denies SI/HI.  Perceptions: Denies A/V hallucinations.  Attention: Fair in the context of the Psychiatric interview.  Cognition: Suspect baseline to be grossly intact.   Memory: Suspect baseline to be grossly intact. Long-term memory overall intact in the context of current events.   Insight: Fair  Judgement: Fair  Impulsivity: Fair in the context of the inpatient unit.
Appearance: Patient appears stated age and biological sex. Appropriately dressed and groomed in hospital gown.  Behavior: Patient was cooperative, in good behavioral control.  Speech: Normal in rate, tone, production, volume.  Motor: No gross motor abnormalities noted including clinically significant PMR/PMA, tremors, tics. Gait/station WNL.  Mood: Patient endorsed mood to be "good."  Affect: Euthymic, stable, normal expressivity, congruent to mood.  Thought Process: Linear, logical  Thought Content: Appropriate given context, no preoccupations or obsessions. Currently denies SI/HI.  Perceptions: Denies A/V hallucinations.  Attention: Fair in the context of the Psychiatric interview.  Cognition: Suspect baseline to be grossly intact.   Memory: Suspect baseline to be grossly intact. Long-term memory overall intact in the context of current events.   Insight: Good  Judgement: Good  Impulsivity: Fair in the context of the inpatient unit.
Appearance: Patient appears stated age and biological sex. Appropriately dressed and groomed in hospital gown.  Behavior: Patient was cooperative, in good behavioral control.  Speech: Normal in rate, tone, production, volume.  Motor: No gross motor abnormalities noted including clinically significant PMR/PMA, tremors, tics. Gait/station WNL.  Mood: Patient endorsed mood to be "less anxious today."  Affect: Euthymic, stable, normal expressivity, congruent to mood.  Thought Process: Linear, logical  Thought Content: Appropriate given context, no preoccupations or obsessions. Currently denies SI/HI.  Perceptions: Denies A/V hallucinations.  Attention: Fair in the context of the Psychiatric interview.  Cognition: Suspect baseline to be grossly intact.   Memory: Suspect baseline to be grossly intact. Long-term memory overall intact in the context of current events.   Insight: Fair  Judgement: Fair  Impulsivity: Fair in the context of the inpatient unit.

## 2022-06-20 NOTE — BH INPATIENT PSYCHIATRY PROGRESS NOTE - NSICDXBHPRIMARYDX_PSY_ALL_CORE
Major depressive disorder, recurrent episode with peripartum onset   F33.9  

## 2022-06-20 NOTE — BH INPATIENT PSYCHIATRY DISCHARGE NOTE - HOSPITAL COURSE
The patient is a 31 year-old female, , domiciled at home with  and infant (born 2023), PPHx of MDD since she was a teenager (has been stable on Zoloft), no hx of inpatient Psychiatric hospitalization, no hx of SA, follows outpatient with Psychiatrist, no substance use history, admitted to [facility] 3 after presenting to [facility] ED BIB self after worsening symptoms of MDD, with SI.     Upon arrival on the unit, the patient was depressed, but not suicidal. After discussions with her and her outpatient provider, it was decided to increase her sertraline to 200mg qD. The patient experienced minimal to moderate improvements in her MDD symptoms including improved concentration, sleep, and appetite, likely in the context of distance from her psychosocial stressors at home vs medication effects. The patient was discharged with plans to return to her outpatient provider with referrals for  mental health support resources.     Risk Assessment:  The patient is at an elevated level of chronic risk of harm to herself due to her underlying MDD. Her acute risk factors of MDD symptoms, passive suicidality, and anxiety were mitigated with an inpatient Psychiatric hospitalization (the patient had noticeable symptom improvement). Protective factors include: stable housing, supportive family, chronic history of treatment adherence and therapeutic alliance, lack of history of SA and NSSIB, participation in safety planning, high motivation for treatment, with good insight and judgement (able to conceptualize that MDD is a long process, plans on calling on family support to alleviate some psychosocial stressors of being a new mother). Thus, given the above, the patient is currently not at imminent risk of harm to herself and others, and is thus appropriate for discharge home with her aftercare plans (return to her outpatient provider and referrals for  mental health support resources).     Pt will be discharged and follow-up with outpatient care: Return to Geovany Magdaleno NP, referral made to Virtual Breastfeeding Support Group with [facility].    Patient will be discharged with the following DSM5 Diagnoses: MDD, recurrent episode, with peripartum onset    Patient will be discharged on the following medications: Sertraline 200mg qD     Team to fax discharge documentation, including discussion of hospital course, treatment, and medications.     The patient is a 31 year-old female, , domiciled at home with  and infant (born 2023), PPHx of MDD since she was a teenager (has been stable on Zoloft), no hx of inpatient Psychiatric hospitalization, no hx of SA, follows outpatient with Psychiatrist, no substance use history, admitted to [facility] 3 after presenting to [facility] ED BIB self after worsening symptoms of MDD, with SI.     Upon arrival on the unit, the patient was depressed, but not suicidal. After discussions with her and her outpatient provider, it was decided to increase her sertraline to 200mg qD. The patient experienced moderate improvements in her MDD symptoms including improved concentration, sleep, and appetite, likely in the context of distance from her psychosocial stressors at home vs medication effects. The patient was discharged with plans to return to her outpatient provider with referrals for  mental health support resources.     Risk Assessment:  The patient is at an elevated level of chronic risk of harm to herself due to her underlying MDD. Her acute risk factors of MDD symptoms, passive suicidality, and anxiety were mitigated with an inpatient Psychiatric hospitalization (the patient had noticeable symptom improvement). Protective factors include: stable housing, supportive family, chronic history of treatment adherence and therapeutic alliance, lack of history of SA and NSSIB, participation in safety planning, high motivation for treatment, with good insight and judgement (able to conceptualize that MDD is a long process, plans on calling on family support to alleviate some psychosocial stressors of being a new mother). Thus, given the above, the patient is currently not at imminent risk of harm to herself and others, and is thus appropriate for discharge home with her aftercare plans (return to her outpatient provider and referrals for  mental health support resources).     Pt will be discharged and follow-up with outpatient care: Return to Geovany Magdaleno NP, referral made to Virtual Breastfeeding Support Group with [facility].    Patient will be discharged with the following DSM5 Diagnoses: MDD, recurrent episode, with peripartum onset    Patient will be discharged on the following medications: Sertraline 200mg qD    SW Team to fax discharge documentation, including discussion of hospital course, treatment, and medications.

## 2022-06-20 NOTE — BH INPATIENT PSYCHIATRY PROGRESS NOTE - NSBHASSESSSUMMFT_PSY_ALL_CORE
The patient is a 31 year-old female, , domiciled at home with  and infant (born 2023), PPHx of MDD since she was a teenager (has been stable on Zoloft), no hx of inpatient Psychiatric hospitalization, no hx of SA, follows outpatient with Psychiatrist, no substance use history, admitted to [facility] 3 after presenting to [facility] ED BIB self after worsening symptoms of MDD, with SI.     The patient was euthymic, looking forward discharge. She is not suicidal or violent, amenable to aftercare planning, and has had improvements in insight and judgement.     Plan:  1. Routine Observation  2. D/C on sertraline 200mg qD  3. Encourage participation in group, individual, milieu therapy.  4. Dispo: Provided referral options for outpatient  Psychiatry programs as per patient preference.  The patient is a 31 year-old female, , domiciled at home with  and infant (born 2023), PPHx of MDD since she was a teenager (has been stable on Zoloft), no hx of inpatient Psychiatric hospitalization, no hx of SA, follows outpatient with Psychiatrist, no substance use history, admitted to [facility] 3 after presenting to [facility] ED BIB self after worsening symptoms of MDD, with SI.     The patient was euthymic, looking forward discharge. She is not suicidal or violent, amenable to aftercare planning, and has had improvements in insight and judgement.     Risk Assessment:  The patient is at an elevated level of chronic risk of harm to herself due to her underlying MDD. Her acute risk factors of MDD symptoms, passive suicidality, and anxiety were mitigated with an inpatient Psychiatric hospitalization (the patient had noticeable symptom improvement). Protective factors include: stable housing, supportive family, chronic history of treatment adherence and therapeutic alliance, lack of history of SA and NSSIB, participation in safety planning, high motivation for treatment, with good insight and judgement (able to conceptualize that MDD is a long process, plans on calling on family support to alleviate some psychosocial stressors of being a new mother). Thus, given the above, the patient is currently not at imminent risk of harm to herself and others, and is thus appropriate for discharge home with her aftercare plans (return to her outpatient provider and referrals for  mental health support resources).     Plan:  1. Routine Observation  2. D/C on sertraline 200mg qD  3. Encourage participation in group, individual, milieu therapy.  4. Dispo: Provided referral options for outpatient  Psychiatry programs as per patient preference.

## 2022-06-20 NOTE — BH INPATIENT PSYCHIATRY PROGRESS NOTE - PRN MEDS
MEDICATIONS  (PRN):  LORazepam     Tablet 2 milliGRAM(s) Oral every 6 hours PRN severe anxiety/agitation  LORazepam   Injectable 2 milliGRAM(s) IntraMuscular once PRN severe anxiety/agitation  

## 2022-06-20 NOTE — BH INPATIENT PSYCHIATRY DISCHARGE NOTE - NSDCCPCAREPLAN_GEN_ALL_CORE_FT
PRINCIPAL DISCHARGE DIAGNOSIS  Diagnosis: Major depressive disorder, recurrent episode with peripartum onset  Assessment and Plan of Treatment:

## 2022-06-20 NOTE — BH INPATIENT PSYCHIATRY PROGRESS NOTE - CASE SUMMARY
The patient has continued to show improvement in symptoms.  Mood and anxiety are improving.  Patient has some appropriate anxiety about returning home to stressors - support provided.  She denies any SI/HI and is more hopeful.  She spoke about plans to go to stay with her father for the next week for increased support.  The patient is no longer an acute danger to herself or others, and is stable for discharge home.

## 2022-06-20 NOTE — BH INPATIENT PSYCHIATRY PROGRESS NOTE - CURRENT MEDICATION
MEDICATIONS  (STANDING):  sertraline 200 milliGRAM(s) Oral daily    MEDICATIONS  (PRN):  LORazepam     Tablet 2 milliGRAM(s) Oral every 6 hours PRN severe anxiety/agitation  LORazepam   Injectable 2 milliGRAM(s) IntraMuscular once PRN severe anxiety/agitation

## 2022-06-20 NOTE — BH INPATIENT PSYCHIATRY DISCHARGE NOTE - NSBHATTESTSEENBY_PSY_A_CORE
Plan to:    1)	Admit her to the [facility] under Dr. Valencia  2) 	Keep her NPO and give parenteral fluid to treat her dehydration.  3)	Treat her with parenteral antibiotics,  Zosyn 3.375mg IV Q8 hours  4)	Give narcotics to treat her acute pain PRN  5)	Provide DVT prophylaxis, venodynes   6)	Plan to have her undergo nonoperative management with plan for interval appendectomy    7)	Full support in place Attending Psychiatrist supervising NP/Trainee, meeting pt...

## 2022-06-20 NOTE — BH INPATIENT PSYCHIATRY PROGRESS NOTE - NSBHFUPINTERVALHXFT_PSY_A_CORE
Chart reviewed and case discussed with the team. No acute events over the weekend. The patient was noted to be mostly keeping to herself in her room, but social with her roommate. She received all of her medications.    The patient was seen in her room. She appeared overall euthymic. She mentioned that she was both excited and anxious about going home, but ultimately was looking forward to returning home. She endorses that she feels less anxious than when she was first admitted. She states that she still does sometimes think "what is the point?" However, she reports journaling about things that bring her mark to combat these thoughts. She denies SI/HI and A/V hallucinations. She states that she plans on living with her father as he can provide more childcare for the family, which should help alleviate some life stressors. She was reminded to be treatment adherent, maintain good sleep and diet, and to reach out to her outpatient provider, call 911, or return to the ED if she has any mental health concerns. She was amenable to this suggestion.  Chart reviewed and case discussed with the team. No acute events over the weekend. The patient was noted to be mostly keeping to herself in her room, but social with her roommate. She received all of her medications.    The patient was seen in her room. She appeared overall euthymic. She mentioned that she was both excited and anxious about going home, but ultimately was looking forward to returning home. Support provided.  She endorses that she feels less anxious than when she was first admitted. She states that she still does sometimes think "what is the point?" However, she reports journaling about things that bring her mark to combat these thoughts. She denies SI/HI and A/V hallucinations. She states that she plans on living with her father as he can provide more childcare for the family, which should help alleviate some life stressors. She was reminded to be treatment adherent, maintain good sleep and diet, and to reach out to her outpatient provider, call 911, or return to the ED if she has any mental health concerns. She was amenable to this suggestion.

## 2022-06-20 NOTE — BH INPATIENT PSYCHIATRY PROGRESS NOTE - NSCGIIMPROVESX_PSY_ALL_CORE
3 = Minimally improved - slightly better with little or no clinically meaningful reduction of symptoms.  Represents very little change in basic clinical status, level of care, or functional capacity.
3 = Minimally improved - slightly better with little or no clinically meaningful reduction of symptoms.  Represents very little change in basic clinical status, level of care, or functional capacity.
2 = Much improved - notably better with signficant reduction of symptoms; increase in the level of functioning but some symptoms remain
3 = Minimally improved - slightly better with little or no clinically meaningful reduction of symptoms.  Represents very little change in basic clinical status, level of care, or functional capacity.

## 2022-06-20 NOTE — BH INPATIENT PSYCHIATRY PROGRESS NOTE - NSBHCHARTREVIEWVS_PSY_A_CORE FT
Vital Signs Last 24 Hrs  T(C): 36.4 (06-20-22 @ 06:26), Max: 36.4 (06-20-22 @ 06:26)  T(F): 97.6 (06-20-22 @ 06:26), Max: 97.6 (06-20-22 @ 06:26)  HR: --  BP: --  BP(mean): --  RR: --  SpO2: --    Orthostatic VS  06-20-22 @ 06:26  Lying BP: --/-- HR: --  Sitting BP: 103/63 HR: 69  Standing BP: 105/71 HR: 87  Site: --  Mode: --

## 2022-06-20 NOTE — BH INPATIENT PSYCHIATRY PROGRESS NOTE - NSTXANXGOAL_PSY_ALL_CORE
Be able to perform ADLs and maintain safety despite anxiety/panic daily

## 2022-06-20 NOTE — BH INPATIENT PSYCHIATRY PROGRESS NOTE - NSDCCRITERIA_PSY_ALL_CORE
Safe for discharge, stable, improvement in symptoms

## 2022-06-20 NOTE — BH INPATIENT PSYCHIATRY PROGRESS NOTE - NSCGISEVERILLNESS_PSY_ALL_CORE
5 = Markedly ill - intrusive symptoms that distinctly impair social/occupational function or cause intrusive levels of distress
5 = Markedly ill - intrusive symptoms that distinctly impair social/occupational function or cause intrusive levels of distress
4 = Moderately ill – overt symptoms causing noticeable, but modest, functional impairment or distress; symptom level may warrant medication
5 = Markedly ill - intrusive symptoms that distinctly impair social/occupational function or cause intrusive levels of distress

## 2022-06-24 NOTE — SOCIAL WORK POST DISCHARGE FOLLOW UP NOTE - NSBHSWFOLLOWUP_PSY_ALL_CORE_FT
SW received return call from Pt's NP post discharge on 6/20/22. An appt was arranged for Pt to be seen 6/22/22.  SW confirmed the arrangements with Pt via phone contact.     SW was contacted by  CC on 6/23/22. SW was informed that Pt had missed an appt for 6/23/22.  SW confirmed Pt was not informed of the appt as it was not known to Pt nor SW as an email was sent during SW's time out of office. CC rescheduled Pt's appt for 6/27/22 at 11 am. On 6/23/22 SW attempted contact with Pt. On 6/23/22 SW contacted Pt's . Pt's  accepted the appt on behalf of Pt.  SW contacted CC and confirmed the acceptance of the appt by family. Pt was encouraged to comply with continuity of care needs throughout hospital stay, at time of discharge, and post discharge. At time of Pt's discharge treatment team deemed Pt was not at risk to self nor others.  No further SW interventions needed at this time.

## 2022-07-13 NOTE — SOCIAL WORK POST DISCHARGE FOLLOW UP NOTE - NSBHSWFOLLOWUP_PSY_ALL_CORE_FT
SW received notification from  that Pt had missed the follow up appt.  Pt kept appt with Pt's pre-existing NP.  SW had received unclear msg from [employer] regarding Pt.  On 7/13/22 SW sent email to [employer] to verify Pt's status in regards to [employer]. Pt was encouraged to comply with continuity of care needs throughout hospital stay and at time of discharge. At time of Pt's discharge treatment team deemed Pt was not at risk to self nor others.  SW to follow up.

## 2022-07-20 NOTE — SOCIAL WORK POST DISCHARGE FOLLOW UP NOTE - NSBHSWFOLLOWUP_PSY_ALL_CORE_FT
SW contacted Pt.  Pt reported she is doing well and has been feeling so much better since hospitalization and prior to hospitalization. Pt reported credit to the meds that she believes are just reportedly starting to kick in.  SW made inquiry to the referrals and appt made for Pt. Pt reported apprehension with obtaining services from  due to Sabianist adversities she has encountered in her experiences.  SW explained that the services were for mental health services solely. Pt did not respond to [facility] in particularly other than her report that she did not think the services were a good fit for her.  Pt reports she continues to see NP and has been working with NP to receive services from a psychiatrist of her choosing. Pt reported a couple of pending appts.  Pt reported gratitude for the call. SW provided supports and explained that SW was available for future needs, if needed. Pt was encouraged to comply with continuity of care needs throughout hospital stay, at time of discharge, and post discharge. At time of Pt's discharge treatment team deemed Pt was not at risk to self nor others.  No further SW interventions needed at this time.

## 2023-07-02 NOTE — ED BEHAVIORAL HEALTH ASSESSMENT NOTE - ABNORMAL MOVEMENTS
Communicate risk of Fall with Harm to all staff, patient, and family/Provide visual cue: red socks, yellow wristband, yellow gown, etc/Reinforce activity limits and safety measures with patient and family/Bed in lowest position, wheels locked, appropriate side rails in place/Call bell, personal items and telephone in reach/Instruct patient to call for assistance before getting out of bed/chair/stretcher/Non-slip footwear applied when patient is off stretcher/[place] to call system/Physically safe environment - no spills, clutter or unnecessary equipment/Purposeful Proactive Rounding/Room/bathroom lighting operational, light cord in reach No abnormal movements

## 2024-08-28 NOTE — ED ADULT TRIAGE NOTE - NS_BH TRG QUESTION5_ED_ALL_ED
From: Sirisha Martell  To: Ragini Aguilar  Sent: 8/28/2024 10:58 AM EDT  Subject: Medications for cruise    Hello! We are going on a cruise at the end of September/early October. Is there any way that I could get a steroid and Zpack for the just in case? [place] options. And I don't really want to seek care in the [place] if I needed to. LOL.     As always, I appreciate your help!  
No

## 2024-09-30 NOTE — ED ADULT TRIAGE NOTE - NS_BHTRGCALCULATEDSCORE_ED_A_ED_FT
PROVIDER:[TOKEN:[[id number]],FOLLOWUP:[1 week]] 2 PROVIDER:[TOKEN:[[id number]],FOLLOWUP:[1 week]],PROVIDER:[TOKEN:[[id number]]] PROVIDER:[TOKEN:[[id number]],FOLLOWUP:[1 week]],PROVIDER:[TOKEN:[[id number]]],PROVIDER:[TOKEN:[[id number]]]

## 2024-12-04 ENCOUNTER — NON-APPOINTMENT (OUTPATIENT)
Age: 34
End: 2024-12-04

## 2024-12-04 ENCOUNTER — APPOINTMENT (OUTPATIENT)
Facility: CLINIC | Age: 34
End: 2024-12-04
Payer: COMMERCIAL

## 2024-12-04 VITALS
WEIGHT: 146 LBS | DIASTOLIC BLOOD PRESSURE: 77 MMHG | SYSTOLIC BLOOD PRESSURE: 119 MMHG | HEIGHT: 64 IN | TEMPERATURE: 98.4 F | HEART RATE: 88 BPM | BODY MASS INDEX: 24.92 KG/M2

## 2024-12-04 DIAGNOSIS — F32.A ANXIETY DISORDER, UNSPECIFIED: ICD-10-CM

## 2024-12-04 DIAGNOSIS — G43.909 MIGRAINE, UNSPECIFIED, NOT INTRACTABLE, W/OUT STATUS MIGRAINOSUS: ICD-10-CM

## 2024-12-04 DIAGNOSIS — Z00.00 ENCOUNTER FOR GENERAL ADULT MEDICAL EXAMINATION W/OUT ABNORMAL FINDINGS: ICD-10-CM

## 2024-12-04 DIAGNOSIS — Z82.0 FAMILY HISTORY OF EPILEPSY AND OTHER DISEASES OF THE NERVOUS SYSTEM: ICD-10-CM

## 2024-12-04 DIAGNOSIS — E65 LOCALIZED ADIPOSITY: ICD-10-CM

## 2024-12-04 DIAGNOSIS — Z23 ENCOUNTER FOR IMMUNIZATION: ICD-10-CM

## 2024-12-04 DIAGNOSIS — F41.9 ANXIETY DISORDER, UNSPECIFIED: ICD-10-CM

## 2024-12-04 DIAGNOSIS — Z86.2 PERSONAL HISTORY OF DISEASES OF THE BLOOD AND BLOOD-FORMING ORGANS AND CERTAIN DISORDERS INVOLVING THE IMMUNE MECHANISM: ICD-10-CM

## 2024-12-04 DIAGNOSIS — Z83.49 FAMILY HISTORY OF OTHER ENDOCRINE, NUTRITIONAL AND METABOLIC DISEASES: ICD-10-CM

## 2024-12-04 PROCEDURE — 99213 OFFICE O/P EST LOW 20 MIN: CPT | Mod: 25

## 2024-12-04 PROCEDURE — 99385 PREV VISIT NEW AGE 18-39: CPT | Mod: 25

## 2024-12-04 PROCEDURE — 36415 COLL VENOUS BLD VENIPUNCTURE: CPT

## 2024-12-04 RX ORDER — SUMATRIPTAN 50 MG/1
50 TABLET, FILM COATED ORAL
Qty: 4 | Refills: 0 | Status: ACTIVE | COMMUNITY
Start: 2024-12-04 | End: 1900-01-01

## 2024-12-04 RX ORDER — SERTRALINE HYDROCHLORIDE 100 MG/1
100 TABLET, FILM COATED ORAL DAILY
Qty: 90 | Refills: 0 | Status: ACTIVE | COMMUNITY
Start: 2024-12-04

## 2024-12-05 DIAGNOSIS — E55.9 VITAMIN D DEFICIENCY, UNSPECIFIED: ICD-10-CM

## 2024-12-05 LAB
25(OH)D3 SERPL-MCNC: 22.3 NG/ML
ALBUMIN SERPL ELPH-MCNC: 4.3 G/DL
ALP BLD-CCNC: 76 U/L
ALT SERPL-CCNC: 9 U/L
ANION GAP SERPL CALC-SCNC: 11 MMOL/L
AST SERPL-CCNC: 17 U/L
BASOPHILS # BLD AUTO: 0.09 K/UL
BASOPHILS NFR BLD AUTO: 1 %
BILIRUB SERPL-MCNC: 0.3 MG/DL
BUN SERPL-MCNC: 12 MG/DL
CALCIUM SERPL-MCNC: 9.2 MG/DL
CHLORIDE SERPL-SCNC: 103 MMOL/L
CHOLEST SERPL-MCNC: 198 MG/DL
CO2 SERPL-SCNC: 23 MMOL/L
CREAT SERPL-MCNC: 0.66 MG/DL
EGFR: 117 ML/MIN/1.73M2
EOSINOPHIL # BLD AUTO: 0.22 K/UL
EOSINOPHIL NFR BLD AUTO: 2.5 %
ESTIMATED AVERAGE GLUCOSE: 105 MG/DL
FERRITIN SERPL-MCNC: 20 NG/ML
GLUCOSE SERPL-MCNC: 79 MG/DL
HBA1C MFR BLD HPLC: 5.3 %
HCT VFR BLD CALC: 39 %
HDLC SERPL-MCNC: 62 MG/DL
HGB BLD-MCNC: 12 G/DL
IMM GRANULOCYTES NFR BLD AUTO: 0.5 %
IRON SATN MFR SERPL: 17 %
IRON SERPL-MCNC: 57 UG/DL
LDLC SERPL CALC-MCNC: 118 MG/DL
LYMPHOCYTES # BLD AUTO: 3.25 K/UL
LYMPHOCYTES NFR BLD AUTO: 36.8 %
MAN DIFF?: NORMAL
MCHC RBC-ENTMCNC: 28.3 PG
MCHC RBC-ENTMCNC: 30.8 G/DL
MCV RBC AUTO: 92 FL
MONOCYTES # BLD AUTO: 0.47 K/UL
MONOCYTES NFR BLD AUTO: 5.3 %
NEUTROPHILS # BLD AUTO: 4.76 K/UL
NEUTROPHILS NFR BLD AUTO: 53.9 %
NONHDLC SERPL-MCNC: 136 MG/DL
PLATELET # BLD AUTO: 363 K/UL
POTASSIUM SERPL-SCNC: 4.2 MMOL/L
PROT SERPL-MCNC: 7.1 G/DL
RBC # BLD: 4.24 M/UL
RBC # FLD: 12.6 %
SODIUM SERPL-SCNC: 138 MMOL/L
TIBC SERPL-MCNC: 331 UG/DL
TRIGL SERPL-MCNC: 101 MG/DL
TSH SERPL-ACNC: 1.88 UIU/ML
UIBC SERPL-MCNC: 274 UG/DL
VIT B12 SERPL-MCNC: 467 PG/ML
WBC # FLD AUTO: 8.83 K/UL

## 2024-12-06 ENCOUNTER — TRANSCRIPTION ENCOUNTER (OUTPATIENT)
Age: 34
End: 2024-12-06

## 2024-12-10 ENCOUNTER — APPOINTMENT (OUTPATIENT)
Dept: INTERNAL MEDICINE | Facility: CLINIC | Age: 34
End: 2024-12-10
Payer: COMMERCIAL

## 2024-12-10 ENCOUNTER — MED ADMIN CHARGE (OUTPATIENT)
Age: 34
End: 2024-12-10

## 2024-12-10 PROCEDURE — 91320 SARSCV2 VAC 30MCG TRS-SUC IM: CPT

## 2024-12-10 PROCEDURE — 90656 IIV3 VACC NO PRSV 0.5 ML IM: CPT

## 2024-12-10 PROCEDURE — G0008: CPT

## 2024-12-10 PROCEDURE — 90480 ADMN SARSCOV2 VAC 1/ONLY CMP: CPT

## 2024-12-19 ENCOUNTER — TRANSCRIPTION ENCOUNTER (OUTPATIENT)
Age: 34
End: 2024-12-19

## 2024-12-20 ENCOUNTER — TRANSCRIPTION ENCOUNTER (OUTPATIENT)
Age: 34
End: 2024-12-20

## 2025-01-09 NOTE — BH INPATIENT PSYCHIATRY PROGRESS NOTE - CURRENT MEDICATION
80 oz water/day    Wean off soda    The healthy items the MIND diet guidelines* suggest include:    3+ servings a day of whole grains  1+ servings a day of vegetables (other than green leafy)  6+ servings a week of green leafy vegetables  5+ servings a week of nuts  4+ meals a week of beans  2+ servings a week of berries  2+ meals a week of poultry  1+ meals a week of fish  Mainly olive oil if added fat is used    The unhealthy items, which are higher in saturated and trans fat, include:    Less than 5 servings a week of pastries and sweets  Less than 4 servings a week of red meat (including beef, pork, lamb, and products made from these meats)  Less than one serving a week of cheese and fried foods  Less than 1 tablespoon a day of butter/stick margarine    How to Keep Your Brain Healthy And how to know if you may have a problem [id number]  Physical exercise: Physical exercise allows more blood flow to get to your brain. All exercise is better than none, but gardening and walking and even weight training are not the same as cardio exercise. Cardio exercise involves getting your heart rate up and a real sweat going. It releases BDNF (brain derived neurotrophic factor) which helps stabilize neurons. Research studies suggest symptoms progress slower and brain atrophy slows in cardio groups. This can be done with high intensity workouts, running, biking, stationary biking, rowing machine, or in a swimming pool. Talk to your health care provider before doing strenuous exercise.  Diet: Eating a heart-healthy diet may help protect brain function. Further information about the Mediterranean/MIND diet is listed below. Talk to your primary medical provider about your specific diet needs.  Sleep: The toxic proteins that cause neurodegeneration are only primarily cleared during deep slow wave stages of sleep. If this is disrupted, less proteins are cleared. Sleep is also important for memory consolidation and restorative aspects to  help with attention of tasks throughout the next day. The average person requires 7-9 hours of quality sleep per night. Try to maintain a sleep routine. Avoid caffeine in the afternoon and heavy meals after 7 pm. Talk to your local medical provider if you are having problems with sleeping (for example: insomnia, obstructive sleep apnea, sleeping all day, or not sleeping enough).  Mental exercise: Push your brain with novel things, novel places. Doing activities, such as reading, learning an instrument, or a new language. Utilize cognitive training apps or programs. There is no evidence currently that one works better than other, but make sure not to focus on one thing (crosswords, Sudoku puzzles, Lumosity, etc.) Make sure to have a broad cognitive program.  Stress Reduction: Simplify your life as much as possible. Meditate, do yoga or nate chi, or perform deep breathing, etc. Stress is going to amplify any underlying problem.   Spiritual Fitness: Find something you are passionate about and gives your life meaning and purpose each day. Get involved. Stay active in your local community and with social functions.    Mediterranean Diet [id number]  The Mediterranean Diet and the MIND diet are the best studied across neurodegenerative diseases. Both have shown a reduced risk of Alzheimer’s Disease across multiple studies, ranging from 35-50% reduced risk. The Mediterranean diet is considered to be a heart-healthy diet. The key features of the diet include:  Fish/Shellfish: Eat 3 or more servings per week. Try to make 1 or more servings be a fatty fish, such as tuna, salmon, trout, mackerel, or sardines.  Half of your plate should be vegetables and fruits. It is important to eat a wide variety of colors of fruits and vegetables for proper nutrition. Try to incorporate blue and purple fruits and vegetables into your diet, as these contain a higher amount of flavonoids (flavonoids provide antioxidant benefits).  Limit red  meat (beef and pork) to no more than 1-2 servings per week. Choose lean meat choices such as chicken or turkey.  Legumes/Beans: Eat 3 or more servings per week.  Choose 100% whole grain or whole wheat bread.  Nuts/Seeds: Eat 1 or more servings per week.  Tomato sauce: At least twice per week.  Extra virgin olive oil for cooking and in salad dressing  Limit your intake of high fat foods, high sugar foods, and high fat dairy products.  Wine can be found as part of the Mediterranean diet. Red wine provides more benefit than the white wine, and the wine should be consumed with the meal. For men, the amount is 5 ounces per day, and 3 ounces per day for women. If you do not drink alcohol, there is health benefit with drinking purple grape juice.    Topics discussed included supervision and monitoring of medication, possibility of future driving limitations, and other safety issues including weapons, cooking and fall prevention.     Preventing Falls at Home And how to get up if you do fall [id number]  Avoidance of falls risk behavior was discussed with the patient including home safety with removal of loose rugs, maintenance of clear pathways and stairs, use of stair railings, good lighting, night lights, bathroom safety, safe showering, shower chairs, grab bars, and kitchen safety. Safe ambulation was discussed with the patient including avoiding stepping over curbs or other objects, avoiding uneven paths or juan, avoid using unsteady objects for reaching high area, sitting if task does not require standing, sit if fatigued.    Caring for a Person with Alzheimer’s Disease- National [place] of Aging  A guidebook containing helpful tips and resources when facing a dementia diagnosis.  The discussion included recommendations to complete an advanced directive and appoint a power of ; information was provided on advance directives (Advance Health Care Planning: Making Your Wishes Known [id number]) and  resources available for support in the community. We would strongly advise getting involved with a  and/or  for assistance with benefits and resources going forward.     In-Home Supervision: Increasing safety and reducing the risk of harm for your loved one [id number]  Helping a Loved One Who Has Dementia [id number]  Information for Caregivers: Taking Care of Yourself [id number]  Caregiving can be very personally rewarding with providing the best care for your loved one. Please be sure, as a caregiver, you are taking your own health into priority and recognizing signs of caregiver burnout.    Medications   Your physician will provide recommendations on a medication that would be best for you. During our discussion, we talked about some common medications used and their side effects.    Resources  www.[employer]ano  www.lila.nih.org a website of the U.S. Department of Health and Human services  www.alz.org a website of the Alzheimer’s Association (includes a 24/7 helpline phone number)  Your local healthcare provider  Patient online service  Clinical Trials:  [facility] 1-706.862.9667  www.[facility].org Provides information about clinical trials available at [facility].  www.clinicaltrials.gov Provides information about clinical trials available at [facility], as well as lists trials available nationwide.    CBS Resources:  www.dementiasociety.org    Alzheimer's Association - [place] Chapter   http://www.alzmndak.org/     Cure PSP  www.psp.org    Research  www.clinicaltrials.gov  [place] 1-881.124.4961  http://mayoresearch.[place].[facility]/[place]/research/alzheimers_center/   Alzheimer's Disease at [facility]   http://www.[facility].org/alzheimers-disease/     Alzheimer's Association   http://www.alz.org/     Alzheimer's Disease Education and Referral Center (ADEAR)   http://www.lila.nih.gov/alzheimers     NINDS Alzheimer's Disease Information  Page   http://www.ninds.nih.gov/disorders/alzheimersdisease/alzheimersdisease.htm     *Note: modest variations in amounts of these foods have been used in subsequent studies. [9,10]     MEDICATIONS  (STANDING):  sertraline 200 milliGRAM(s) Oral daily    MEDICATIONS  (PRN):  LORazepam     Tablet 2 milliGRAM(s) Oral every 6 hours PRN severe anxiety/agitation  LORazepam   Injectable 2 milliGRAM(s) IntraMuscular once PRN severe anxiety/agitation

## 2025-02-19 ENCOUNTER — APPOINTMENT (OUTPATIENT)
Facility: CLINIC | Age: 35
End: 2025-02-19
Payer: COMMERCIAL

## 2025-02-19 VITALS
DIASTOLIC BLOOD PRESSURE: 71 MMHG | HEART RATE: 76 BPM | SYSTOLIC BLOOD PRESSURE: 105 MMHG | HEIGHT: 64 IN | TEMPERATURE: 98.1 F | BODY MASS INDEX: 25.95 KG/M2 | OXYGEN SATURATION: 96 % | WEIGHT: 152 LBS

## 2025-02-19 DIAGNOSIS — G43.909 MIGRAINE, UNSPECIFIED, NOT INTRACTABLE, W/OUT STATUS MIGRAINOSUS: ICD-10-CM

## 2025-02-19 DIAGNOSIS — E65 LOCALIZED ADIPOSITY: ICD-10-CM

## 2025-02-19 PROCEDURE — 99214 OFFICE O/P EST MOD 30 MIN: CPT

## 2025-02-19 PROCEDURE — G2211 COMPLEX E/M VISIT ADD ON: CPT | Mod: NC

## 2025-02-19 RX ORDER — ZOLMITRIPTAN 2.5 MG/1
2.5 SPRAY, METERED NASAL
Qty: 1 | Refills: 0 | Status: ACTIVE | COMMUNITY
Start: 2025-02-19 | End: 1900-01-01

## 2025-02-19 RX ORDER — NAPROXEN 500 MG/1
500 TABLET ORAL
Qty: 30 | Refills: 0 | Status: ACTIVE | COMMUNITY
Start: 2025-02-19 | End: 1900-01-01

## 2025-02-25 ENCOUNTER — TRANSCRIPTION ENCOUNTER (OUTPATIENT)
Age: 35
End: 2025-02-25

## 2025-02-25 LAB — CORTIS SERPL-MCNC: 10.1 UG/DL

## 2025-03-10 ENCOUNTER — TRANSCRIPTION ENCOUNTER (OUTPATIENT)
Age: 35
End: 2025-03-10

## 2025-03-10 DIAGNOSIS — R82.998 OTHER ABNORMAL FINDINGS IN URINE: ICD-10-CM

## 2025-09-15 ENCOUNTER — TRANSCRIPTION ENCOUNTER (OUTPATIENT)
Age: 35
End: 2025-09-15